# Patient Record
Sex: FEMALE | Race: BLACK OR AFRICAN AMERICAN | NOT HISPANIC OR LATINO | URBAN - METROPOLITAN AREA
[De-identification: names, ages, dates, MRNs, and addresses within clinical notes are randomized per-mention and may not be internally consistent; named-entity substitution may affect disease eponyms.]

---

## 2018-06-29 ENCOUNTER — EMERGENCY (EMERGENCY)
Facility: HOSPITAL | Age: 28
LOS: 1 days | Discharge: HOME | End: 2018-06-29
Admitting: EMERGENCY MEDICINE
Payer: COMMERCIAL

## 2018-06-29 VITALS
DIASTOLIC BLOOD PRESSURE: 70 MMHG | OXYGEN SATURATION: 100 % | RESPIRATION RATE: 16 BRPM | TEMPERATURE: 98 F | HEART RATE: 75 BPM | SYSTOLIC BLOOD PRESSURE: 112 MMHG

## 2018-06-29 LAB
ALBUMIN SERPL ELPH-MCNC: 4.1 G/DL — SIGNIFICANT CHANGE UP (ref 3.3–5)
ALP SERPL-CCNC: 43 U/L — SIGNIFICANT CHANGE UP (ref 40–120)
ALT FLD-CCNC: 25 U/L — SIGNIFICANT CHANGE UP (ref 4–33)
APTT BLD: 34.7 SEC — SIGNIFICANT CHANGE UP (ref 27.5–37.4)
AST SERPL-CCNC: 20 U/L — SIGNIFICANT CHANGE UP (ref 4–32)
BASOPHILS # BLD AUTO: 0.03 K/UL — SIGNIFICANT CHANGE UP (ref 0–0.2)
BASOPHILS NFR BLD AUTO: 0.5 % — SIGNIFICANT CHANGE UP (ref 0–2)
BILIRUB SERPL-MCNC: 0.5 MG/DL — SIGNIFICANT CHANGE UP (ref 0.2–1.2)
BLD GP AB SCN SERPL QL: NEGATIVE — SIGNIFICANT CHANGE UP
BUN SERPL-MCNC: 9 MG/DL — SIGNIFICANT CHANGE UP (ref 7–23)
CALCIUM SERPL-MCNC: 9.2 MG/DL — SIGNIFICANT CHANGE UP (ref 8.4–10.5)
CHLORIDE SERPL-SCNC: 103 MMOL/L — SIGNIFICANT CHANGE UP (ref 98–107)
CO2 SERPL-SCNC: 27 MMOL/L — SIGNIFICANT CHANGE UP (ref 22–31)
CREAT SERPL-MCNC: 0.77 MG/DL — SIGNIFICANT CHANGE UP (ref 0.5–1.3)
EOSINOPHIL # BLD AUTO: 0.09 K/UL — SIGNIFICANT CHANGE UP (ref 0–0.5)
EOSINOPHIL NFR BLD AUTO: 1.4 % — SIGNIFICANT CHANGE UP (ref 0–6)
GLUCOSE SERPL-MCNC: 91 MG/DL — SIGNIFICANT CHANGE UP (ref 70–99)
HCT VFR BLD CALC: 35.9 % — SIGNIFICANT CHANGE UP (ref 34.5–45)
HGB BLD-MCNC: 11.6 G/DL — SIGNIFICANT CHANGE UP (ref 11.5–15.5)
IMM GRANULOCYTES # BLD AUTO: 0 # — SIGNIFICANT CHANGE UP
IMM GRANULOCYTES NFR BLD AUTO: 0 % — SIGNIFICANT CHANGE UP (ref 0–1.5)
INR BLD: 1.15 — SIGNIFICANT CHANGE UP (ref 0.88–1.17)
LYMPHOCYTES # BLD AUTO: 3.59 K/UL — HIGH (ref 1–3.3)
LYMPHOCYTES # BLD AUTO: 57.7 % — HIGH (ref 13–44)
MCHC RBC-ENTMCNC: 24.3 PG — LOW (ref 27–34)
MCHC RBC-ENTMCNC: 32.3 % — SIGNIFICANT CHANGE UP (ref 32–36)
MCV RBC AUTO: 75.3 FL — LOW (ref 80–100)
MONOCYTES # BLD AUTO: 0.59 K/UL — SIGNIFICANT CHANGE UP (ref 0–0.9)
MONOCYTES NFR BLD AUTO: 9.5 % — SIGNIFICANT CHANGE UP (ref 2–14)
NEUTROPHILS # BLD AUTO: 1.92 K/UL — SIGNIFICANT CHANGE UP (ref 1.8–7.4)
NEUTROPHILS NFR BLD AUTO: 30.9 % — LOW (ref 43–77)
NRBC # FLD: 0 — SIGNIFICANT CHANGE UP
PLATELET # BLD AUTO: 387 K/UL — SIGNIFICANT CHANGE UP (ref 150–400)
PMV BLD: 11.1 FL — SIGNIFICANT CHANGE UP (ref 7–13)
POTASSIUM SERPL-MCNC: 3.7 MMOL/L — SIGNIFICANT CHANGE UP (ref 3.5–5.3)
POTASSIUM SERPL-SCNC: 3.7 MMOL/L — SIGNIFICANT CHANGE UP (ref 3.5–5.3)
PROT SERPL-MCNC: 7.5 G/DL — SIGNIFICANT CHANGE UP (ref 6–8.3)
PROTHROM AB SERPL-ACNC: 12.8 SEC — SIGNIFICANT CHANGE UP (ref 9.8–13.1)
RBC # BLD: 4.77 M/UL — SIGNIFICANT CHANGE UP (ref 3.8–5.2)
RBC # FLD: 13.2 % — SIGNIFICANT CHANGE UP (ref 10.3–14.5)
RH IG SCN BLD-IMP: POSITIVE — SIGNIFICANT CHANGE UP
SODIUM SERPL-SCNC: 140 MMOL/L — SIGNIFICANT CHANGE UP (ref 135–145)
WBC # BLD: 6.22 K/UL — SIGNIFICANT CHANGE UP (ref 3.8–10.5)
WBC # FLD AUTO: 6.22 K/UL — SIGNIFICANT CHANGE UP (ref 3.8–10.5)

## 2018-06-29 PROCEDURE — 99284 EMERGENCY DEPT VISIT MOD MDM: CPT

## 2018-06-29 RX ORDER — CHLORHEXIDINE GLUCONATE 213 G/1000ML
15 SOLUTION TOPICAL
Qty: 1 | Refills: 0 | OUTPATIENT
Start: 2018-06-29 | End: 2018-07-12

## 2018-06-29 NOTE — ED PROVIDER NOTE - CARE PLAN
Principal Discharge DX:	Pericoronitis  Assessment and plan of treatment:	Advance activity as tolerated.  Continue all previously prescribed medications as directed unless otherwise instructed.  STOP taking amoxicillin.  START taking Augmentin twice a day for 1 week.  Use Peridex as prescribed.  Follow up with your primary care physician and dental (call [phone] to make an appointment)  in 48-72 hours- bring copies of your results.  Return to the ER for worsening or persistent symptoms, including but not limited to worsening/persistent pain, fevers, inability to open mouth, difficulty swallowing, throat pain and/or ANY NEW OR CONCERNING SYMPTOMS. If you have issues obtaining follow up, please call: [phone] to obtain a doctor or specialist who takes your insurance in your area.  You may call [phone] to make an appointment with the internal medicine clinic.

## 2018-06-29 NOTE — ED PROVIDER NOTE - MEDICAL DECISION MAKING DETAILS
Pt is a 28 y/o F nonsmoker PMHx glaucoma sent by dentist for evaluation today --  space infection as seen on MRI -- labs, dental consult

## 2018-06-29 NOTE — PROGRESS NOTE ADULT - SUBJECTIVE AND OBJECTIVE BOX
Patient is a 27y old  Female who presents with a chief complaint of dental pain from wisdom tooth LL    HPI: Pt has been having pain for approx 1.5 months and was prescribed amoxicillin for past 3 weeks. Pt was scheduled to have 4 wisdom teeth removed under general anesthesia on July 3rd but as per pt, oral surgeons office cancelled appt due to limited opening and referred to [facility] ED for evaluation.      PAST MEDICAL & SURGICAL HISTORY:  Glaucoma  No significant past surgical history      MEDICATIONS  (STANDING): Amoxicillin    Allergies    No Known Allergies    Vital Signs Last 24 Hrs  T(C): 36.9 (29 Jun 2018 14:51), Max: 36.9 (29 Jun 2018 14:51)  T(F): 98.5 (29 Jun 2018 14:51), Max: 98.5 (29 Jun 2018 14:51)  HR: 75 (29 Jun 2018 14:51) (75 - 75)  BP: 112/70 (29 Jun 2018 14:51) (112/70 - 112/70)  BP(mean): --  RR: 16 (29 Jun 2018 14:51) (16 - 16)  SpO2: 100% (29 Jun 2018 14:51) (100% - 100%)    EOE:  TMJ ( -  ) clicks                    ( -   ) pops                    ( -   ) crepitus             Mandible: Trismus due to guarding Max opening ~ 20mm             ( +  ) trismus             ( -  ) LAD             ( -  ) swelling             ( -  ) asymmetry             ( - ) palpation    IOE:  Permanent dentition: Missing #16; Partially impacted #17 with operculum- edematous and erythematous           hard/soft palate: WNL  ( -  ) palatal torus           tongue/FOM WNL           labial/buccal mucosa- erythematous gingiva around erupting #17           ( +  ) percussion- #17           ( +  ) palpation- gingiva and operculum over #17           ( -  ) swelling     Dentition present: Yes; Permanent dentition     LABS:                        11.6   6.22  )-----------( 387      ( 29 Jun 2018 16:14 )             35.9     06-29    140  |  103  |  9   ----------------------------<  91  3.7   |  27  |  0.77    Ca    9.2      29 Jun 2018 16:14    TPro  7.5  /  Alb  4.1  /  TBili  0.5  /  DBili  x   /  AST  20  /  ALT  25  /  AlkPhos  43  06-29    WBC Count: 6.22 K/uL [3.8 - 10.5] (06-29 @ 16:14)  Platelet Count - Automated: 387 K/uL [150 - 400] (06-29 @ 16:14)  INR: 1.15 [0.88 - 1.17] (06-29 @ 16:14)    *DENTAL RADIOGRAPHS:  Panorex; PARL around horizontally impacted #17- no acute drainable abscess.     ASSESSMENT: OS consulted; pt to followup with inpatient for evaluation of thirds; Pt has trismus due to guarding. Augmentin prescribed instead of amoxicillin and pt taught to clean under operculum with chlorhexidine rinse. Pt to followup with [facility] dental on monday for evaluation with OS.    PROCEDURE:  EOE, IOE, Rads.     RECOMMENDATIONS:  1) Augmentin 1 week as per ED attending; Chlorhexidine rinse; OTC pain management  2) Dental F/U with [facility] dental for evaluation of thirds.  3) Dental F/U with outpatient dentist for comprehensive dental care.   4) If any difficulty swallowing/breathing, fever occur, page dental.     Marbella Kenney DDS [id number]

## 2018-06-29 NOTE — ED PROVIDER NOTE - OBJECTIVE STATEMENT
Pt is a 28 y/o F nonsmoker PMHx glaucoma sent by dentist for evaluation today.  Pt states she has had trismus for past 1.5 months associated with pain to left mandibular wisdom tooth.  Pt states she has been on Amoxicillin twice a day for past three weeks.  Pt was evaluated by dentist 1 week ago who arranged pt to have MRI.  Pt had MRI two days ago.  Pt was notified by Dental of results of MRI and was sent to ED for evaluation.  Pt notes trismus has improved since last week.  Pt notes mild left sided facial swelling.  Denies any fevers, chills, throat pain, difficulty swallowing, shortness of breath, chest pain.

## 2018-06-29 NOTE — ED PROVIDER NOTE - PLAN OF CARE
Advance activity as tolerated.  Continue all previously prescribed medications as directed unless otherwise instructed.  STOP taking amoxicillin.  START taking Augmentin twice a day for 1 week.  Use Peridex as prescribed.  Follow up with your primary care physician and dental (call [phone] to make an appointment)  in 48-72 hours- bring copies of your results.  Return to the ER for worsening or persistent symptoms, including but not limited to worsening/persistent pain, fevers, inability to open mouth, difficulty swallowing, throat pain and/or ANY NEW OR CONCERNING SYMPTOMS. If you have issues obtaining follow up, please call: [phone] to obtain a doctor or specialist who takes your insurance in your area.  You may call [phone] to make an appointment with the internal medicine clinic.

## 2018-06-29 NOTE — ED ADULT TRIAGE NOTE - CHIEF COMPLAINT QUOTE
c/o left side bottom wisdom tooth pain and infection confirmed on MRI performed 2 days ago. Was referred to Ed by dental for evaluation.  Denies any fever. Has been on Amoxicillin x 2 weeks.

## 2018-06-29 NOTE — ED PROVIDER NOTE - PROGRESS NOTE DETAILS
CHELSIE SUÁREZ:  Pt seen by dental who states there is no focal area of collection.  Dental worked with oral surgery to make final determination to have pt follow up in clinic to have extraction.  Discussed case with pt's oral surgeon Dr. Brittanie Carmichael who agrees with plan.  Pt requests to go home. Pt medically stable for discharge.  Dental recommends peridex and augmentin.

## 2021-07-22 NOTE — ED ADULT NURSE NOTE - [id number]_[id number]_ED
-- DO NOT REPLY / DO NOT REPLY ALL --  -- Message is from the [facility]--    COVID-19 Universal Screening: N/A - Not about scheduling    General Patient Message      Reason for Call: Patient is calling to request a call back. She has been attempting for 2 months to obtain her medication but her Pharmacy states that they are in back order and have yet to fill her prescription. She would like assistance in obtain the medication via mail order    Caller Information       Type Contact Phone    09/22/2020 03:55 PM CDT Phone (Incoming) Sara Stover (Self) [phone] (M)          Alternative phone number:     Turnaround time given to caller:   \"This message will be sent to [state Provider's name]. The clinical team will fulfill your request as soon as they review your message.\"     Is it okay to send refills for cabergoline and vitamin D?  Lv6/12/020 I don't see vitamin d result   no No